# Patient Record
(demographics unavailable — no encounter records)

---

## 2024-12-10 NOTE — CARDIOLOGY SUMMARY
[No Ischemia] : no Ischemia [LVEF ___%] : LVEF [unfilled]% [___] : [unfilled] - Echo 1/8/18: EF 35%, LVH, apical and mid interventricular septum, apical anterior, apical inferior are all akinetic.  The true apex appears dyskinetic.  There is an apical LV thrombus measuring 2 x 1.7 cm. LV clot increased from 1cm in 9/2017.  - Due to increased size of LV thromvus started on A/C.  Continue Heparin gtt and Coumadin Bridge, INR 1.13 today. Pt to receive Coumadin 10mg PO x 1 tonight.   - Patient is not a candidate for Lovenox/Coumadin bridge per Dr. Alexis  - No GI consult at this time as per Dr. Levy for clearance for anticoagulation given h/o GI bleeding. [Enlarged] : enlarged LA size [None] : no mitral regurgitation

## 2024-12-10 NOTE — CARDIOLOGY SUMMARY
[No Ischemia] : no Ischemia [LVEF ___%] : LVEF [unfilled]% [___] : [unfilled] [Enlarged] : enlarged LA size [None] : no mitral regurgitation
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
## 2024-12-11 NOTE — PHYSICAL EXAM
[General Appearance - Well Developed] : well developed [Normal Appearance] : normal appearance [Well Groomed] : well groomed [General Appearance - Well Nourished] : well nourished [No Deformities] : no deformities [General Appearance - In No Acute Distress] : no acute distress [Normal Conjunctiva] : the conjunctiva exhibited no abnormalities [Eyelids - No Xanthelasma] : the eyelids demonstrated no xanthelasmas [Normal Oral Mucosa] : normal oral mucosa [No Oral Pallor] : no oral pallor [No Oral Cyanosis] : no oral cyanosis [Normal Jugular Venous A Waves Present] : normal jugular venous A waves present [Normal Jugular Venous V Waves Present] : normal jugular venous V waves present [No Jugular Venous Vela A Waves] : no jugular venous vela A waves [Respiration, Rhythm And Depth] : normal respiratory rhythm and effort [Exaggerated Use Of Accessory Muscles For Inspiration] : no accessory muscle use [Auscultation Breath Sounds / Voice Sounds] : lungs were clear to auscultation bilaterally [Heart Rate And Rhythm] : heart rate and rhythm were normal [Heart Sounds] : normal S1 and S2 [Murmurs] : no murmurs present [Abdomen Soft] : soft [Abdomen Tenderness] : non-tender [Abdomen Mass (___ Cm)] : no abdominal mass palpated [Abnormal Walk] : normal gait [Gait - Sufficient For Exercise Testing] : the gait was sufficient for exercise testing [Nail Clubbing] : no clubbing of the fingernails [Cyanosis, Localized] : no localized cyanosis [Petechial Hemorrhages (___cm)] : no petechial hemorrhages [Skin Color & Pigmentation] : normal skin color and pigmentation [] : no rash [No Venous Stasis] : no venous stasis [Skin Lesions] : no skin lesions [No Skin Ulcers] : no skin ulcer [No Xanthoma] : no  xanthoma was observed [Oriented To Time, Place, And Person] : oriented to person, place, and time [Affect] : the affect was normal [Mood] : the mood was normal [No Anxiety] : not feeling anxious [FreeTextEntry1] : No edema. Pedal pulses intact, 2+ on the left 2+ on the right. No peripheral stigmata of aortic insufficiency

## 2024-12-11 NOTE — REASON FOR VISIT
: Yes [FreeTextEntry1] : Followup with 86-year-old woman with hypertension, hyperlipidemia, positive family history of coronary disease, and a dilated ascending aorta. (Now she's a blond.). Found to have HOCM.

## 2024-12-11 NOTE — ASSESSMENT
[FreeTextEntry1] : Mrs. Navarrete was doing well. She continued to be fairly active. No new complaints of chest pain but mild worsening of shortness of breath, Mild pedal edema in warm weather only on the right. Her aneurysm remained unchanged on CAT scan in March of 2017. Her echo in September of 2016 had moderate-to-severe aortic insufficiency with normal LV function. This was repeated 9/2017 and 4/2018 and again 5/2019 without change. 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 Echo today with higher total gradient which could be more increase in LVOT gradient and perhaps some more an aortic stenosis gradient but still a lot of aortic insufficiency and does not seem to have critical AS. On exam her aortic insufficiency murmur is hard to hear today; her aortic stenosis sounds a little worse and MR also. 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 Her EKG shows sinus rhythm at 68, a left anterior hemiblock and poor R wave progression with an old T-wave inversion in III and aVF. There is voltage for LVH today by R in I and aVL and S in III. precordial leads have poor R wave progression. Blood pressure control is good. She had a stress echo in January, 2021 as mentioned above but has been reluctant to go back on the treadmill. Since then no change in symptoms although today admitted to maybe more shortness of breath with exertion but still able to go up and down the 1 flight in her apartment very frequently. Echo today with slow minimal progression in both aortic and mitral valve disease and may be progression in her LVOT gradient. (Her BNP in August had increased significantly to 684 with a further increase to 942 on April 1, 2021. No significant change in September 2021) Labs sent including a BNP. With her degree of aortic insufficiency and overall valvular heart disease she should now have an echo every 6 months. Will continue following her aortic aneurysm by echo instead of chest CT given the concern for radiation by her oncologist. There may have been a small increase to 4.4 cm today. I did bring up the issue as to whether it would pay to have her evaluated for TAVR at her age with her symptoms and combined AI and AS although not sure how much the LVOT gradient would play a role. 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 Garza eliminated her vasodilating blood pressure medications and increased her beta-blocker. Patient is very difficult to pin down as to whether her shortness of breath has improved but fatigue worsened etc. She may be more depressed on the higher dose of metoprolol. I reiterated the options that Dr. Garza gave her about disopyramide versus [place] versus increasing and in my mind changing beta-blocker to something that might make her less fatigued and less depressed, perhaps bisoprolol starting at 10 mg and possibly increasing further if tolerated. The patient will think about it over the weekend together with her daughter and then we will hopefully decide on Monday, 24 October which way to go or whether to leave well enough alone. We reviewed the fact that we now follow her aortic aneurysms just by echo and all the recent echoes have shown 4.3 cm so no change. Patient returned February 28, 2024. Still did not make up her mind about mavacamten as she was worried about all the side effects they mention on TV. Meanwhile she is not feeling better but in fact more tired and fatigued but no symptoms or signs of fluid overload and congestive heart failure per se. Remains in sinus rhythm with an unchanged ECG and her exam is impressive but unchanged as well. Discussed together with her daughter and by the end she agreed to enroll in the mavacamten trial. Meanwhile baseline labs were sent especially proBNP.  Patient returns today December 10, 2024 together with her daughter and granddaughter for follow-up and echocardiogram.  In the interim she had started mavacamten and with the 5 mg dose her shortness of breath seem to resolved for the most part.  We went up to 10 mg for a while but she did not seem to have much clinical difference and it did seem to be a decrease in LV systolic function.  He went back to 5 mg and she did okay.  Last echocardiogram was fairly suboptimal and it was hard to say if the LVEF had dropped just below 50% or not and her mavacamten was suspended although she still has some pills left.  On today's echo her LVEF is clearly 50 to 55% so she can stay on the 5 mg of mavacamten.  There is minimal LVOT gradient only going up to 13 mmHg with Valsalva.  There is not that much of an aortic valve gradient in the 20s at peak but there is now moderate to severe AI.  No signs of fluid overload and again she is not complaining of shortness of breath.  However there has been a significant weight loss that is of unknown etiology.  She had some type of workup back in August and we will repeat labs today to see if there is any suggestion of an etiology.  In the meantime she will continue her current medications and return in about 3 months for follow-up visit and follow-up echo again at that time.

## 2024-12-11 NOTE — DISCUSSION/SUMMARY
[FreeTextEntry1] : Continue mavacamten 5 mg.  Complete labs sent to include proBNP, lipids, hemoglobin A1c, TSH, renal function, sed rate etc.  Tentative follow-up 3 months with another echo at that time. [EKG obtained to assist in diagnosis and management of assessed problem(s)] : EKG obtained to assist in diagnosis and management of assessed problem(s)

## 2024-12-11 NOTE — REASON FOR VISIT
[FreeTextEntry1] : Followup with 86-year-old woman with hypertension, hyperlipidemia, positive family history of coronary disease, and a dilated ascending aorta. (Now she's a blond.). Found to have HOCM.
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
## 2024-12-11 NOTE — REVIEW OF SYSTEMS
[Feeling Fatigued] : feeling fatigued [Joint Pain] : joint pain [Joint Stiffness] : joint stiffness [Negative] : Heme/Lymph [Weight Loss (___ Lbs)] : [unfilled] ~Ulb weight loss [Change in Appetite] : change in appetite [Weight Gain (___ Lbs)] : no recent weight gain [Dyspnea on exertion] : not dyspnea during exertion [Chest Discomfort] : no chest discomfort [Lower Ext Edema] : no extremity edema [Syncope] : no syncope [FreeTextEntry7] : See HPI [FreeTextEntry9] : See HPI

## 2024-12-11 NOTE — ASSESSMENT
[FreeTextEntry1] : Mrs. Navarrete was doing well. She continued to be fairly active. No new complaints of chest pain but mild worsening of shortness of breath, Mild pedal edema in warm weather only on the right. Her aneurysm remained unchanged on CAT scan in March of 2017. Her echo in September of 2016 had moderate-to-severe aortic insufficiency with normal LV function. This was repeated 9/2017 and 4/2018 and again 5/2019 without change. 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 Echo today with higher total gradient which could be more increase in LVOT gradient and perhaps some more an aortic stenosis gradient but still a lot of aortic insufficiency and does not seem to have critical AS. On exam her aortic insufficiency murmur is hard to hear today; her aortic stenosis sounds a little worse and MR also. 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 Her EKG shows sinus rhythm at 68, a left anterior hemiblock and poor R wave progression with an old T-wave inversion in III and aVF. There is voltage for LVH today by R in I and aVL and S in III. precordial leads have poor R wave progression. Blood pressure control is good. She had a stress echo in January, 2021 as mentioned above but has been reluctant to go back on the treadmill. Since then no change in symptoms although today admitted to maybe more shortness of breath with exertion but still able to go up and down the 1 flight in her apartment very frequently. Echo today with slow minimal progression in both aortic and mitral valve disease and may be progression in her LVOT gradient. (Her BNP in August had increased significantly to 684 with a further increase to 942 on April 1, 2021. No significant change in September 2021) Labs sent including a BNP. With her degree of aortic insufficiency and overall valvular heart disease she should now have an echo every 6 months. Will continue following her aortic aneurysm by echo instead of chest CT given the concern for radiation by her oncologist. There may have been a small increase to 4.4 cm today. I did bring up the issue as to whether it would pay to have her evaluated for TAVR at her age with her symptoms and combined AI and AS although not sure how much the LVOT gradient would play a role. 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 Garza eliminated her vasodilating blood pressure medications and increased her beta-blocker. Patient is very difficult to pin down as to whether her shortness of breath has improved but fatigue worsened etc. She may be more depressed on the higher dose of metoprolol. I reiterated the options that Dr. Garaz gave her about disopyramide versus [place] versus increasing and in my mind changing beta-blocker to something that might make her less fatigued and less depressed, perhaps bisoprolol starting at 10 mg and possibly increasing further if tolerated. The patient will think about it over the weekend together with her daughter and then we will hopefully decide on Monday, 24 October which way to go or whether to leave well enough alone. We reviewed the fact that we now follow her aortic aneurysms just by echo and all the recent echoes have shown 4.3 cm so no change. Patient returned February 28, 2024. Still did not make up her mind about mavacamten as she was worried about all the side effects they mention on TV. Meanwhile she is not feeling better but in fact more tired and fatigued but no symptoms or signs of fluid overload and congestive heart failure per se. Remains in sinus rhythm with an unchanged ECG and her exam is impressive but unchanged as well. Discussed together with her daughter and by the end she agreed to enroll in the mavacamten trial. Meanwhile baseline labs were sent especially proBNP.  Patient returns today December 10, 2024 together with her daughter and granddaughter for follow-up and echocardiogram.  In the interim she had started mavacamten and with the 5 mg dose her shortness of breath seem to resolved for the most part.  We went up to 10 mg for a while but she did not seem to have much clinical difference and it did seem to be a decrease in LV systolic function.  He went back to 5 mg and she did okay.  Last echocardiogram was fairly suboptimal and it was hard to say if the LVEF had dropped just below 50% or not and her mavacamten was suspended although she still has some pills left.  On today's echo her LVEF is clearly 50 to 55% so she can stay on the 5 mg of mavacamten.  There is minimal LVOT gradient only going up to 13 mmHg with Valsalva.  There is not that much of an aortic valve gradient in the 20s at peak but there is now moderate to severe AI.  No signs of fluid overload and again she is not complaining of shortness of breath.  However there has been a significant weight loss that is of unknown etiology.  She had some type of workup back in August and we will repeat labs today to see if there is any suggestion of an etiology.  In the meantime she will continue her current medications and return in about 3 months for follow-up visit and follow-up echo again at that time.
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
## 2025-02-03 NOTE — REVIEW OF SYSTEMS
[Feeling Fatigued] : feeling fatigued [Change in Appetite] : change in appetite [Joint Pain] : joint pain [Joint Stiffness] : joint stiffness [Negative] : Heme/Lymph [Weight Gain (___ Lbs)] : no recent weight gain [Weight Loss (___ Lbs)] : no recent weight loss [Dyspnea on exertion] : not dyspnea during exertion [Chest Discomfort] : no chest discomfort [Lower Ext Edema] : no extremity edema [Syncope] : no syncope [FreeTextEntry7] : See HPI [FreeTextEntry9] : See HPI

## 2025-02-03 NOTE — ASSESSMENT
[FreeTextEntry1] : Mrs. Navarrete was doing well. She continued to be fairly active. No new complaints of chest pain but mild worsening of shortness of breath, Mild pedal edema in warm weather only on the right. Her aneurysm remained unchanged on CAT scan in March of 2017. Her echo in September of 2016 had moderate-to-severe aortic insufficiency with normal LV function. This was repeated 9/2017 and 4/2018 and again 5/2019 without change. 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 Echo today with higher total gradient which could be more increase in LVOT gradient and perhaps some more an aortic stenosis gradient but still a lot of aortic insufficiency and does not seem to have critical AS. On exam her aortic insufficiency murmur is hard to hear today; her aortic stenosis sounds a little worse and MR also. 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 Her EKG shows sinus rhythm at 68, a left anterior hemiblock and poor R wave progression with an old T-wave inversion in III and aVF. There is voltage for LVH today by R in I and aVL and S in III. precordial leads have poor R wave progression. Blood pressure control is good. She had a stress echo in January, 2021 as mentioned above but has been reluctant to go back on the treadmill. Since then no change in symptoms although today admitted to maybe more shortness of breath with exertion but still able to go up and down the 1 flight in her apartment very frequently. Echo today with slow minimal progression in both aortic and mitral valve disease and may be progression in her LVOT gradient. (Her BNP in August had increased significantly to 684 with a further increase to 942 on April 1, 2021. No significant change in September 2021) Labs sent including a BNP. With her degree of aortic insufficiency and overall valvular heart disease she should now have an echo every 6 months. Will continue following her aortic aneurysm by echo instead of chest CT given the concern for radiation by her oncologist. There may have been a small increase to 4.4 cm today. I did bring up the issue as to whether it would pay to have her evaluated for TAVR at her age with her symptoms and combined AI and AS although not sure how much the LVOT gradient would play a role. 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 Garza eliminated her vasodilating blood pressure medications and increased her beta-blocker. Patient is very difficult to pin down as to whether her shortness of breath has improved but fatigue worsened etc. She may be more depressed on the higher dose of metoprolol. I reiterated the options that Dr. Garza gave her about disopyramide versus [place] versus increasing and in my mind changing beta-blocker to something that might make her less fatigued and less depressed, perhaps bisoprolol starting at 10 mg and possibly increasing further if tolerated. The patient will think about it over the weekend together with her daughter and then we will hopefully decide on Monday, 24 October which way to go or whether to leave well enough alone. We reviewed the fact that we now follow her aortic aneurysms just by echo and all the recent echoes have shown 4.3 cm so no change. Patient returned February 28, 2024. Still did not make up her mind about mavacamten as she was worried about all the side effects they mention on TV. Meanwhile she is not feeling better but in fact more tired and fatigued but no symptoms or signs of fluid overload and congestive heart failure per se. Remains in sinus rhythm with an unchanged ECG and her exam is impressive but unchanged as well. Discussed together with her daughter and by the end she agreed to enroll in the mavacamten trial. Meanwhile baseline labs were sent especially proBNP. Patient returned December 10, 2024 together with her daughter and granddaughter for follow-up and echocardiogram. In the interim she had started mavacamten and with the 5 mg dose her shortness of breath seem to resolved for the most part. We went up to 10 mg for a while but she did not seem to have much clinical difference and it did seem to be a decrease in LV systolic function. He went back to 5 mg and she did okay. Last echocardiogram was fairly suboptimal and it was hard to say if the LVEF had dropped just below 50% or not and her mavacamten was suspended although she still has some pills left. On today's echo her LVEF is clearly 50 to 55% so she can stay on the 5 mg of mavacamten. There is minimal LVOT gradient only going up to 13 mmHg with Valsalva. There is not that much of an aortic valve gradient in the 20s at peak but there is now moderate to severe AI. No signs of fluid overload and again she is not complaining of shortness of breath. However there has been a significant weight loss that is of unknown etiology. She had some type of workup back in August and we will repeat labs today to see if there is any suggestion of an etiology. In the meantime she will continue her current medications and return in about 3 months for follow-up visit and follow-up echo again at that time. January 8, 2025.  Echo done for Camzyos trial. Mostly unchanged. LVEF between 55 and 60%. LVOT gradient not very high. Still with moderate to severe AI and mild to moderate MR.        She returns today this time with her granddaughter February 3, 2025.  She definitely is less short of breath on the mavacamten.  The echo looks pretty much the same as the one she did just a month ago.  Small gradient in the LV outflow tract, acceptable ejection fraction around 53 to 60%.  Moderate or moderate to severe AI.  No edema or rales on exam but her blood pressure is still running too high even at the end of the exam so I would like to add back amlodipine starting at just 5 mg daily and have her return in about 2 months.  Otherwise routine labs were sent.  For now she will remain on the 5 mg dose of mavacamten

## 2025-02-03 NOTE — DISCUSSION/SUMMARY
[FreeTextEntry1] : Labs sent including proBNP and electrolytes.  For now she will remain on the 5 mg dose of mavacamten.  For blood pressure them adding back amlodipine but only 5 mg a day.  No change in the metoprolol ER.  Will follow-up in 2 months. [EKG obtained to assist in diagnosis and management of assessed problem(s)] : EKG obtained to assist in diagnosis and management of assessed problem(s)

## 2025-02-03 NOTE — CARDIOLOGY SUMMARY
[No Ischemia] : no Ischemia [LVEF ___%] : LVEF [unfilled]% [___] : [unfilled] [Enlarged] : enlarged LA size [None] : no mitral regurgitation [___] : [unfilled]

## 2025-03-03 NOTE — REASON FOR VISIT
[FreeTextEntry1] : Followup with 88-year-old woman with hypertension, hyperlipidemia, positive family history of coronary disease, and a dilated ascending aorta. (Now she's a blond.). Found to have HOCM Here today for clearance for cataract surgery

## 2025-03-03 NOTE — DISCUSSION/SUMMARY
[EKG obtained to assist in diagnosis and management of assessed problem(s)] : EKG obtained to assist in diagnosis and management of assessed problem(s) [FreeTextEntry1] : Definitely improved with the amlodipine and improved with the Camzyos and seems fairly stable from a cardiovascular point of view.  She should not be very high risk for the anesthesia and upcoming cataract surgery.

## 2025-03-03 NOTE — ASSESSMENT
[FreeTextEntry1] : Mrs. Navarrete was doing well. She continued to be fairly active. No new complaints of chest pain but mild worsening of shortness of breath, Mild pedal edema in warm weather only on the right. Her aneurysm remained unchanged on CAT scan in March of 2017. Her echo in September of 2016 had moderate-to-severe aortic insufficiency with normal LV function. This was repeated 9/2017 and 4/2018 and again 5/2019 without change. 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 Echo today with higher total gradient which could be more increase in LVOT gradient and perhaps some more an aortic stenosis gradient but still a lot of aortic insufficiency and does not seem to have critical AS. On exam her aortic insufficiency murmur is hard to hear today; her aortic stenosis sounds a little worse and MR also. 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 Her EKG shows sinus rhythm at 68, a left anterior hemiblock and poor R wave progression with an old T-wave inversion in III and aVF. There is voltage for LVH today by R in I and aVL and S in III. precordial leads have poor R wave progression. Blood pressure control is good. She had a stress echo in January, 2021 as mentioned above but has been reluctant to go back on the treadmill. Since then no change in symptoms although today admitted to maybe more shortness of breath with exertion but still able to go up and down the 1 flight in her apartment very frequently. Echo today with slow minimal progression in both aortic and mitral valve disease and may be progression in her LVOT gradient. (Her BNP in August had increased significantly to 684 with a further increase to 942 on April 1, 2021. No significant change in September 2021) Labs sent including a BNP. With her degree of aortic insufficiency and overall valvular heart disease she should now have an echo every 6 months. Will continue following her aortic aneurysm by echo instead of chest CT given the concern for radiation by her oncologist. There may have been a small increase to 4.4 cm today. I did bring up the issue as to whether it would pay to have her evaluated for TAVR at her age with her symptoms and combined AI and AS although not sure how much the LVOT gradient would play a role. 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 Garza eliminated her vasodilating blood pressure medications and increased her beta-blocker. Patient is very difficult to pin down as to whether her shortness of breath has improved but fatigue worsened etc. She may be more depressed on the higher dose of metoprolol. I reiterated the options that Dr. Garza gave her about disopyramide versus [place] versus increasing and in my mind changing beta-blocker to something that might make her less fatigued and less depressed, perhaps bisoprolol starting at 10 mg and possibly increasing further if tolerated. The patient will think about it over the weekend together with her daughter and then we will hopefully decide on Monday, 24 October which way to go or whether to leave well enough alone. We reviewed the fact that we now follow her aortic aneurysms just by echo and all the recent echoes have shown 4.3 cm so no change. Patient returned February 28, 2024. Still did not make up her mind about mavacamten as she was worried about all the side effects they mention on TV. Meanwhile she is not feeling better but in fact more tired and fatigued but no symptoms or signs of fluid overload and congestive heart failure per se. Remains in sinus rhythm with an unchanged ECG and her exam is impressive but unchanged as well. Discussed together with her daughter and by the end she agreed to enroll in the mavacamten trial. Meanwhile baseline labs were sent especially proBNP. Patient returned December 10, 2024 together with her daughter and granddaughter for follow-up and echocardiogram. In the interim she had started mavacamten and with the 5 mg dose her shortness of breath seem to resolved for the most part. We went up to 10 mg for a while but she did not seem to have much clinical difference and it did seem to be a decrease in LV systolic function. He went back to 5 mg and she did okay. Last echocardiogram was fairly suboptimal and it was hard to say if the LVEF had dropped just below 50% or not and her mavacamten was suspended although she still has some pills left. On today's echo her LVEF is clearly 50 to 55% so she can stay on the 5 mg of mavacamten. There is minimal LVOT gradient only going up to 13 mmHg with Valsalva. There is not that much of an aortic valve gradient in the 20s at peak but there is now moderate to severe AI. No signs of fluid overload and again she is not complaining of shortness of breath. However there has been a significant weight loss that is of unknown etiology. She had some type of workup back in August and we will repeat labs today to see if there is any suggestion of an etiology. In the meantime she will continue her current medications and return in about 3 months for follow-up visit and follow-up echo again at that time. January 8, 2025. Echo done for Camzyos trial. Mostly unchanged. LVEF between 55 and 60%. LVOT gradient not very high. Still with moderate to severe AI and mild to moderate MR.  She returned this time with her granddaughter February 3, 2025. She definitely is less short of breath on the mavacamten. The echo looks pretty much the same as the one she did just a month ago. Small gradient in the LV outflow tract, acceptable ejection fraction around 53 to 60%. Moderate or moderate to severe AI. No edema or rales on exam but her blood pressure is still running too high even at the end of the exam so I would like to add back amlodipine starting at just 5 mg daily and have her return in about 2 months. Otherwise routine labs were sent. For now she will remain on the 5 mg dose of mavacamten.  Patient returns today March 3, 2025 needing clearance for cataract surgery.  She has responded very nicely to 5 mg of amlodipine and her blood pressures are acceptable.  She is doing very nicely on the 5 mg of Camzyos and her proBNP had come down nicely as well.  She has no signs of fluid overload, no angina.  Her EKG is sinus rhythm at 61 with sinus arrhythmia, with left anterior hemiblock, LVH voltage, and poor R wave progression all of which are unchanged.  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

## 2025-03-03 NOTE — PHYSICAL EXAM
[General Appearance - Well Developed] : well developed [Normal Appearance] : normal appearance [Well Groomed] : well groomed [General Appearance - Well Nourished] : well nourished [No Deformities] : no deformities [General Appearance - In No Acute Distress] : no acute distress [Normal Conjunctiva] : the conjunctiva exhibited no abnormalities [Eyelids - No Xanthelasma] : the eyelids demonstrated no xanthelasmas [Normal Oral Mucosa] : normal oral mucosa [No Oral Pallor] : no oral pallor [No Oral Cyanosis] : no oral cyanosis [Normal Jugular Venous A Waves Present] : normal jugular venous A waves present [Normal Jugular Venous V Waves Present] : normal jugular venous V waves present [No Jugular Venous Vela A Waves] : no jugular venous vela A waves [Respiration, Rhythm And Depth] : normal respiratory rhythm and effort [Exaggerated Use Of Accessory Muscles For Inspiration] : no accessory muscle use [Heart Rate And Rhythm] : heart rate and rhythm were normal [Heart Sounds] : normal S1 and S2 [Murmurs] : no murmurs present [Abdomen Soft] : soft [Abdomen Tenderness] : non-tender [Abdomen Mass (___ Cm)] : no abdominal mass palpated [Abnormal Walk] : normal gait [Gait - Sufficient For Exercise Testing] : the gait was sufficient for exercise testing [Nail Clubbing] : no clubbing of the fingernails [Cyanosis, Localized] : no localized cyanosis [Petechial Hemorrhages (___cm)] : no petechial hemorrhages [Skin Color & Pigmentation] : normal skin color and pigmentation [] : no rash [No Venous Stasis] : no venous stasis [Skin Lesions] : no skin lesions [No Skin Ulcers] : no skin ulcer [No Xanthoma] : no  xanthoma was observed [Oriented To Time, Place, And Person] : oriented to person, place, and time [Affect] : the affect was normal [Mood] : the mood was normal [No Anxiety] : not feeling anxious [FreeTextEntry1] : S2 still sounds like physiologic splitting.  2-3/6 systolic murmur apex to base, 2-3/6 aortic insufficiency at LLSB, 3/6 HSM to ? axilla no S3.  (+) S4.  Systolic murmur does seem to increase with Valsalva.
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
## 2025-03-03 NOTE — REVIEW OF SYSTEMS
[Feeling Fatigued] : feeling fatigued [Joint Pain] : joint pain [Joint Stiffness] : joint stiffness [Negative] : Heme/Lymph [Weight Gain (___ Lbs)] : no recent weight gain [Weight Loss (___ Lbs)] : no recent weight loss [Dyspnea on exertion] : not dyspnea during exertion [Chest Discomfort] : no chest discomfort [Lower Ext Edema] : no extremity edema [Syncope] : no syncope [Change in Appetite] : no change in appetite [FreeTextEntry9] : See HPI [de-identified] : Does seem depressed at times

## 2025-03-04 NOTE — HISTORY OF PRESENT ILLNESS
[de-identified] : Ms. STEPHANIE DUTTON  is a 87 year old female who presents with a year of left hip pain without any specific cause or trauma.  She uses a cane or walker depending on her pain.  She will take nsaids, which help, but not often because it raises her BP.  She did PT last May which did help.  However, she got covid so she stopped.  She had a right THR in 2011.

## 2025-03-04 NOTE — PHYSICAL EXAM
[de-identified] : Constitutional - the patient is of normal build and nutrition.  She is very limited however and her ambulation she is doing very well with her right total hip replacement but she has marked arthritis in her left shoulder and probable arthritis in her left hip.  She uses her cane for walking but is limited in her walking ability.  She comes in with her daughter who is a physical therapist at [facility].   The patient remains oriented to person, time, and  place.  Mood is normal. Vital signs as recorded.   The patient has no difficulty with respiration. Respiration at rest is a normal rate. The patient is not short of breath and has not become short of breath with short ambulation. There is no audible wheezing. No coughing. Skin is normal for the patient's age. There are no abnormal masses or lymph nodes which stand out in the lower extremities. Spine - deep tendon reflexes are symmetric. Motor and sensory are symmetric. UPPER EXTREMITIES  Shoulders right shoulder moves satisfactory left shoulder only flexes and abducts to approximately 80 degrees gives her pain she does have definite arthritis in this shoulder she had an injection but it did not give her any significant improvement.  There is normal motion in the wrists and elbows. Circulation appears satisfactory with pedal pulses present.  There is no major edema in the lower legs. No skin tenderness or increased temperature. No major varicosities.  HIP EXAMINATION the abduction and abduction as well as rotation measurements were taken with the hip in flexion. Motion She has no significant pain  In her right hip where she has a total hip replacement.  The right hip flexes to 120 degrees with full extension she abducts 45 degrees adduction 10 degrees but she is cautious with moving her legs.  She has no pain.  Her left leg however flexes only to 80 degrees she comes almost to full extension she has only a 30 degree arc of motion and AB and adduction which may be limited by her pain she has pain with motion in this left hip and pain with weightbearing on the side.  KNEE EXAMINATION Right Knee has 0 to 135 degrees of motion with good medial  lateral and anterior posterior stability.  There is no major effusion.  There is no Baker's cyst.  There is no significant patellofemoral crepitus.  The patient has satisfactory strength across the knee.                Left  Knee   has 0 to 135 degrees of motion with good medial lateral and anterior posterior stability.  There is no major effusion there is no Baker's cyst.  There is no significant patellofemoral crepitus.  The patient has satisfactory strength across the knee.              Ankle and foot examination Of the ankle has normal motion.  There is normal ankle stability.  The patient has no major abnormalities of the foot.     [de-identified] : An AP of the pelvis and a lateral right left hip show the right hip is a Biomet all porous prosthesis in excellent position and remains well-fixed..  Her left hip shows severe degenerative osteoarthritis with bone against bone contact medially and superiorly.

## 2025-03-04 NOTE — DISCUSSION/SUMMARY
[de-identified] : This patient has severe degenerative osteoarthritis in her left hip however she has multiple problems she is going for medical clearance to see Dr. Jimenez Holcomb because of a rise where she is going to have cataract surgery her daughter will discuss with Dr. Holcomb the possible use of a nonsteroidal anti-inflammatory agent however this has been limited in the past because she does have an aortic aneurysm which is being watched conservatively and she is not given any medication it can increase her blood pressure.  She is can take Tylenol 1 g up to 3 times a day.  She felt better following the local injection with steroid which she will return in 3 months for follow-up.

## 2025-03-04 NOTE — PROCEDURE
[de-identified] : Procedure Note:   Anatomic Location: Left hip  Diagnosis: Left hip arthritis  Procedure: Left injection of 4ccs of Marcaine 0.5% plain and Kenalog 40, 1 cc  Local Spray: Ethyl Chloride.  Skin preparation with alcohol.  Patient has consented for the procedure.  Injection 22-gauge spinal needle through an anterior lateral approach.  Patient tolerated the procedure well.  Patient instructed to call the office if any reaction, fever, chills, increased erythema or swelling.   [phone].

## 2025-03-04 NOTE — HISTORY OF PRESENT ILLNESS
[de-identified] : Ms. STEPHANIE DUTTON  is a 87 year old female who presents with a year of left hip pain without any specific cause or trauma.  She uses a cane or walker depending on her pain.  She will take nsaids, which help, but not often because it raises her BP.  She did PT last May which did help.  However, she got covid so she stopped.  She had a right THR in 2011.

## 2025-03-04 NOTE — DISCUSSION/SUMMARY
[de-identified] : This patient has severe degenerative osteoarthritis in her left hip however she has multiple problems she is going for medical clearance to see Dr. Jimenez Holcomb because of a rise where she is going to have cataract surgery her daughter will discuss with Dr. Holcomb the possible use of a nonsteroidal anti-inflammatory agent however this has been limited in the past because she does have an aortic aneurysm which is being watched conservatively and she is not given any medication it can increase her blood pressure.  She is can take Tylenol 1 g up to 3 times a day.  She felt better following the local injection with steroid which she will return in 3 months for follow-up.

## 2025-03-04 NOTE — PROCEDURE
[de-identified] : Procedure Note:   Anatomic Location: Left hip  Diagnosis: Left hip arthritis  Procedure: Left injection of 4ccs of Marcaine 0.5% plain and Kenalog 40, 1 cc  Local Spray: Ethyl Chloride.  Skin preparation with alcohol.  Patient has consented for the procedure.  Injection 22-gauge spinal needle through an anterior lateral approach.  Patient tolerated the procedure well.  Patient instructed to call the office if any reaction, fever, chills, increased erythema or swelling.   [phone].

## 2025-03-04 NOTE — PHYSICAL EXAM
[de-identified] : Constitutional - the patient is of normal build and nutrition.  She is very limited however and her ambulation she is doing very well with her right total hip replacement but she has marked arthritis in her left shoulder and probable arthritis in her left hip.  She uses her cane for walking but is limited in her walking ability.  She comes in with her daughter who is a physical therapist at [facility].   The patient remains oriented to person, time, and  place.  Mood is normal. Vital signs as recorded.   The patient has no difficulty with respiration. Respiration at rest is a normal rate. The patient is not short of breath and has not become short of breath with short ambulation. There is no audible wheezing. No coughing. Skin is normal for the patient's age. There are no abnormal masses or lymph nodes which stand out in the lower extremities. Spine - deep tendon reflexes are symmetric. Motor and sensory are symmetric. UPPER EXTREMITIES  Shoulders right shoulder moves satisfactory left shoulder only flexes and abducts to approximately 80 degrees gives her pain she does have definite arthritis in this shoulder she had an injection but it did not give her any significant improvement.  There is normal motion in the wrists and elbows. Circulation appears satisfactory with pedal pulses present.  There is no major edema in the lower legs. No skin tenderness or increased temperature. No major varicosities.  HIP EXAMINATION the abduction and abduction as well as rotation measurements were taken with the hip in flexion. Motion She has no significant pain  In her right hip where she has a total hip replacement.  The right hip flexes to 120 degrees with full extension she abducts 45 degrees adduction 10 degrees but she is cautious with moving her legs.  She has no pain.  Her left leg however flexes only to 80 degrees she comes almost to full extension she has only a 30 degree arc of motion and AB and adduction which may be limited by her pain she has pain with motion in this left hip and pain with weightbearing on the side.  KNEE EXAMINATION Right Knee has 0 to 135 degrees of motion with good medial  lateral and anterior posterior stability.  There is no major effusion.  There is no Baker's cyst.  There is no significant patellofemoral crepitus.  The patient has satisfactory strength across the knee.                Left  Knee   has 0 to 135 degrees of motion with good medial lateral and anterior posterior stability.  There is no major effusion there is no Baker's cyst.  There is no significant patellofemoral crepitus.  The patient has satisfactory strength across the knee.              Ankle and foot examination Of the ankle has normal motion.  There is normal ankle stability.  The patient has no major abnormalities of the foot.     [de-identified] : An AP of the pelvis and a lateral right left hip show the right hip is a Biomet all porous prosthesis in excellent position and remains well-fixed..  Her left hip shows severe degenerative osteoarthritis with bone against bone contact medially and superiorly.

## 2025-04-28 NOTE — REVIEW OF SYSTEMS
[Weight Gain (___ Lbs)] : no recent weight gain [Feeling Fatigued] : feeling fatigued [Weight Loss (___ Lbs)] : no recent weight loss [Dyspnea on exertion] : not dyspnea during exertion [Chest Discomfort] : no chest discomfort [Lower Ext Edema] : no extremity edema [Syncope] : no syncope [Change in Appetite] : no change in appetite [Joint Pain] : joint pain [Joint Stiffness] : joint stiffness [Negative] : Heme/Lymph [FreeTextEntry9] : See HPI [de-identified] : Does seem depressed at times

## 2025-04-28 NOTE — PHYSICAL EXAM
[General Appearance - Well Developed] : well developed [Normal Appearance] : normal appearance [Well Groomed] : well groomed [General Appearance - Well Nourished] : well nourished [No Deformities] : no deformities [General Appearance - In No Acute Distress] : no acute distress [Normal Conjunctiva] : the conjunctiva exhibited no abnormalities [Eyelids - No Xanthelasma] : the eyelids demonstrated no xanthelasmas [Normal Oral Mucosa] : normal oral mucosa [No Oral Pallor] : no oral pallor [No Oral Cyanosis] : no oral cyanosis [Normal Jugular Venous A Waves Present] : normal jugular venous A waves present [Normal Jugular Venous V Waves Present] : normal jugular venous V waves present [No Jugular Venous Vela A Waves] : no jugular venous vela A waves [Respiration, Rhythm And Depth] : normal respiratory rhythm and effort [Exaggerated Use Of Accessory Muscles For Inspiration] : no accessory muscle use [Heart Rate And Rhythm] : heart rate and rhythm were normal [Heart Sounds] : normal S1 and S2 [Murmurs] : no murmurs present [Abdomen Soft] : soft [Abdomen Tenderness] : non-tender [Abdomen Mass (___ Cm)] : no abdominal mass palpated [Abnormal Walk] : normal gait [Gait - Sufficient For Exercise Testing] : the gait was sufficient for exercise testing [Nail Clubbing] : no clubbing of the fingernails [Cyanosis, Localized] : no localized cyanosis [Petechial Hemorrhages (___cm)] : no petechial hemorrhages [FreeTextEntry1] : No edema except mild non-pitting below right knee. Pedal pulses intact, 2+ on the left 2+ on the right. No peripheral stigmata of aortic insufficiency [Skin Color & Pigmentation] : normal skin color and pigmentation [] : no rash [No Venous Stasis] : no venous stasis [Skin Lesions] : no skin lesions [No Skin Ulcers] : no skin ulcer [No Xanthoma] : no  xanthoma was observed [Oriented To Time, Place, And Person] : oriented to person, place, and time [Affect] : the affect was normal [Mood] : the mood was normal [No Anxiety] : not feeling anxious

## 2025-04-28 NOTE — REASON FOR VISIT
[FreeTextEntry1] : Followup with 88-year-old woman with hypertension, hyperlipidemia, positive family history of coronary disease, and a dilated ascending aorta. (Now she's a blond.). Found to have HOCM

## 2025-04-28 NOTE — ASSESSMENT
[FreeTextEntry1] : Mrs. Navarrete was doing well. She continued to be fairly active. No new complaints of chest pain but mild worsening of shortness of breath, Mild pedal edema in warm weather only on the right. Her aneurysm remained unchanged on CAT scan in March of 2017. Her echo in September of 2016 had moderate-to-severe aortic insufficiency with normal LV function. This was repeated 9/2017 and 4/2018 and again 5/2019 without change. 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 Echo today with higher total gradient which could be more increase in LVOT gradient and perhaps some more an aortic stenosis gradient but still a lot of aortic insufficiency and does not seem to have critical AS. On exam her aortic insufficiency murmur is hard to hear today; her aortic stenosis sounds a little worse and MR also. 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 Her EKG shows sinus rhythm at 68, a left anterior hemiblock and poor R wave progression with an old T-wave inversion in III and aVF. There is voltage for LVH today by R in I and aVL and S in III. precordial leads have poor R wave progression. Blood pressure control is good. She had a stress echo in January, 2021 as mentioned above but has been reluctant to go back on the treadmill. Since then no change in symptoms although today admitted to maybe more shortness of breath with exertion but still able to go up and down the 1 flight in her apartment very frequently. Echo today with slow minimal progression in both aortic and mitral valve disease and may be progression in her LVOT gradient. (Her BNP in August had increased significantly to 684 with a further increase to 942 on April 1, 2021. No significant change in September 2021) Labs sent including a BNP. With her degree of aortic insufficiency and overall valvular heart disease she should now have an echo every 6 months. Will continue following her aortic aneurysm by echo instead of chest CT given the concern for radiation by her oncologist. There may have been a small increase to 4.4 cm today. I did bring up the issue as to whether it would pay to have her evaluated for TAVR at her age with her symptoms and combined AI and AS although not sure how much the LVOT gradient would play a role. 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 Garza eliminated her vasodilating blood pressure medications and increased her beta-blocker. Patient is very difficult to pin down as to whether her shortness of breath has improved but fatigue worsened etc. She may be more depressed on the higher dose of metoprolol. I reiterated the options that Dr. Garza gave her about disopyramide versus [place] versus increasing and in my mind changing beta-blocker to something that might make her less fatigued and less depressed, perhaps bisoprolol starting at 10 mg and possibly increasing further if tolerated. The patient will think about it over the weekend together with her daughter and then we will hopefully decide on Monday, 24 October which way to go or whether to leave well enough alone. We reviewed the fact that we now follow her aortic aneurysms just by echo and all the recent echoes have shown 4.3 cm so no change. Patient returned February 28, 2024. Still did not make up her mind about mavacamten as she was worried about all the side effects they mention on TV. Meanwhile she is not feeling better but in fact more tired and fatigued but no symptoms or signs of fluid overload and congestive heart failure per se. Remains in sinus rhythm with an unchanged ECG and her exam is impressive but unchanged as well. Discussed together with her daughter and by the end she agreed to enroll in the mavacamten trial. Meanwhile baseline labs were sent especially proBNP. Patient returned December 10, 2024 together with her daughter and granddaughter for follow-up and echocardiogram. In the interim she had started mavacamten and with the 5 mg dose her shortness of breath seem to resolved for the most part. We went up to 10 mg for a while but she did not seem to have much clinical difference and it did seem to be a decrease in LV systolic function. He went back to 5 mg and she did okay. Last echocardiogram was fairly suboptimal and it was hard to say if the LVEF had dropped just below 50% or not and her mavacamten was suspended although she still has some pills left. On today's echo her LVEF is clearly 50 to 55% so she can stay on the 5 mg of mavacamten. There is minimal LVOT gradient only going up to 13 mmHg with Valsalva. There is not that much of an aortic valve gradient in the 20s at peak but there is now moderate to severe AI. No signs of fluid overload and again she is not complaining of shortness of breath. However there has been a significant weight loss that is of unknown etiology. She had some type of workup back in August and we will repeat labs today to see if there is any suggestion of an etiology. In the meantime she will continue her current medications and return in about 3 months for follow-up visit and follow-up echo again at that time. January 8, 2025. Echo done for Camzyos trial. Mostly unchanged. LVEF between 55 and 60%. LVOT gradient not very high. Still with moderate to severe AI and mild to moderate MR.  She returned this time with her granddaughter February 3, 2025. She definitely is less short of breath on the mavacamten. The echo looks pretty much the same as the one she did just a month ago. Small gradient in the LV outflow tract, acceptable ejection fraction around 53 to 60%. Moderate or moderate to severe AI. No edema or rales on exam but her blood pressure is still running too high even at the end of the exam so I would like to add back amlodipine starting at just 5 mg daily and have her return in about 2 months. Otherwise routine labs were sent. For now she will remain on the 5 mg dose of mavacamten. Patient returned March 3, 2025 needing clearance for cataract surgery.  She has responded very nicely to 5 mg of amlodipine and her blood pressures are acceptable.  She is doing very nicely on the 5 mg of Camzy[place] and her proBNP had come down nicely as well.  She has no signs of fluid overload, no angina.  Her EKG is sinus rhythm at 61 with sinus arrhythmia, with left anterior hemiblock, LVH voltage, and poor R wave progression all of which are unchanged.  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      Patient returns today for follow-up and for routine echo for mavacamten.  Clinically doing very well.  Echo mostly unchanged including aortic size, mild AS with moderate AI, normal LV systolic function, only mild LVOT gradient without change with Valsalva.  Clinically she feels wonderful.  I reassured her that the small area of swelling without redness or tenderness below her knee is not from heart failure etc.  Blood pressure excellent on the amlodipine 5 mg and we will continue that.
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
## 2025-04-28 NOTE — DISCUSSION/SUMMARY
[FreeTextEntry1] : Reassure patient how well she is doing.  Will not need another echo for 6 months I believe that the change in the protocol from mavacamten.  Blood pressure excellent so we will continue amlodipine 5 mg.  Routine labs sent.  Clinical follow-up can go back to 4 months.  EKG remains sinus rhythm and unchanged with LVH and left anterior hemiblock [EKG obtained to assist in diagnosis and management of assessed problem(s)] : EKG obtained to assist in diagnosis and management of assessed problem(s)

## 2025-05-07 NOTE — REVIEW OF SYSTEMS
[Negative] : Endocrine [FreeTextEntry3] : cataract extract Left eye with lens implant  [FreeTextEntry5] : high blood pressure and vascular disease/ hypertrophic cardiomyopathy

## 2025-05-07 NOTE — CONSULT LETTER
[Dear  ___] : Dear  [unfilled], [Courtesy Letter:] : I had the pleasure of seeing your patient, [unfilled], in my office today. [Please see my note below.] : Please see my note below. [Sincerely,] : Sincerely, [DrJose  ___] : Dr. BARROSO [DrJose ___] : Dr. BARROSO [FreeTextEntry3] : Erlinda Glasgow, RPA-C\par  Breast Surgery\par  [address]\par  [address]\[place] [address]\par  (Phone) [phone]\par  (Fax) [phone]

## 2025-05-07 NOTE — ASSESSMENT
[FreeTextEntry1] : H/O bilateral breast cancer No evidence of disease recurrence on CBE   1. Follow up office visit due as needed or with her daughter's appointment 12/2026 2. Advised monthly self breast examinations and advised her to contact me if she has any concerns.  3. Gave her the list of bra boutiques and prescription for bras an prosthesis.

## 2025-05-07 NOTE — HISTORY OF PRESENT ILLNESS
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 Garza for cardiomyopathy, she started on Camzyous. She denies any current chest wall concerns  She is here with her daughter.

## 2025-05-07 NOTE — PHYSICAL EXAM
[Normocephalic] : normocephalic [Atraumatic] : atraumatic [EOMI] : extra ocular movement intact [Sclera nonicteric] : sclera nonicteric [Supple] : supple [No Supraclavicular Adenopathy] : no supraclavicular adenopathy [Examined in the supine and seated position] : examined in the supine and seated position [No dominant masses] : no dominant masses in right breast  [No dominant masses] : no dominant masses left breast [No Axillary Lymphadenopathy] : no left axillary lymphadenopathy [No Edema] : no edema [No Rashes] : no rashes [No Ulceration] : no ulceration [de-identified] : S/P bilateral mastectomies without reconstruction. Bilateral keratoses.